# Patient Record
Sex: FEMALE | Race: WHITE | Employment: OTHER | URBAN - NONMETROPOLITAN AREA
[De-identification: names, ages, dates, MRNs, and addresses within clinical notes are randomized per-mention and may not be internally consistent; named-entity substitution may affect disease eponyms.]

---

## 2022-06-13 ENCOUNTER — HOSPITAL ENCOUNTER (OUTPATIENT)
Age: 82
Setting detail: OBSERVATION
Discharge: HOME | End: 2022-06-14
Attending: HOSPITALIST | Admitting: HOSPITALIST
Payer: MEDICARE

## 2022-06-13 PROBLEM — I48.91 ATRIAL FIBRILLATION (HCC): Status: ACTIVE | Noted: 2022-06-13

## 2022-06-13 PROBLEM — R07.9 CHEST PAIN: Status: ACTIVE | Noted: 2022-06-13

## 2022-06-13 LAB
ALBUMIN SERPL-MCNC: 4.1 G/DL (ref 3.5–5.2)
ALP BLD-CCNC: 72 U/L (ref 35–104)
ALT SERPL-CCNC: 16 U/L (ref 5–33)
ANION GAP SERPL CALCULATED.3IONS-SCNC: 11 MMOL/L (ref 7–19)
AST SERPL-CCNC: 24 U/L (ref 5–32)
BASOPHILS ABSOLUTE: 0 K/UL (ref 0–0.2)
BASOPHILS RELATIVE PERCENT: 0.5 % (ref 0–1)
BILIRUB SERPL-MCNC: 0.3 MG/DL (ref 0.2–1.2)
BUN BLDV-MCNC: 10 MG/DL (ref 8–23)
CALCIUM SERPL-MCNC: 9.2 MG/DL (ref 8.8–10.2)
CHLORIDE BLD-SCNC: 105 MMOL/L (ref 98–111)
CO2: 25 MMOL/L (ref 22–29)
CREAT SERPL-MCNC: 0.5 MG/DL (ref 0.5–0.9)
EOSINOPHILS ABSOLUTE: 0 K/UL (ref 0–0.6)
EOSINOPHILS RELATIVE PERCENT: 0.5 % (ref 0–5)
GFR AFRICAN AMERICAN: >59
GFR NON-AFRICAN AMERICAN: >60
GLUCOSE BLD-MCNC: 101 MG/DL (ref 74–109)
HBA1C MFR BLD: 5.4 % (ref 4–6)
HCT VFR BLD CALC: 42.6 % (ref 37–47)
HEMOGLOBIN: 13.4 G/DL (ref 12–16)
IMMATURE GRANULOCYTES #: 0 K/UL
INR BLD: 0.94 (ref 0.88–1.18)
LV EF: 55 %
LVEF MODALITY: NORMAL
LYMPHOCYTES ABSOLUTE: 1.2 K/UL (ref 1.1–4.5)
LYMPHOCYTES RELATIVE PERCENT: 20.5 % (ref 20–40)
MCH RBC QN AUTO: 30.6 PG (ref 27–31)
MCHC RBC AUTO-ENTMCNC: 31.5 G/DL (ref 33–37)
MCV RBC AUTO: 97.3 FL (ref 81–99)
MONOCYTES ABSOLUTE: 0.4 K/UL (ref 0–0.9)
MONOCYTES RELATIVE PERCENT: 7.7 % (ref 0–10)
NEUTROPHILS ABSOLUTE: 4 K/UL (ref 1.5–7.5)
NEUTROPHILS RELATIVE PERCENT: 70.4 % (ref 50–65)
PDW BLD-RTO: 13.9 % (ref 11.5–14.5)
PLATELET # BLD: 170 K/UL (ref 130–400)
PMV BLD AUTO: 9.7 FL (ref 9.4–12.3)
POTASSIUM SERPL-SCNC: 4.1 MMOL/L (ref 3.5–5)
PRO-BNP: 1304 PG/ML (ref 0–1800)
PROTHROMBIN TIME: 12.5 SEC (ref 12–14.6)
RBC # BLD: 4.38 M/UL (ref 4.2–5.4)
REASON FOR REJECTION: NORMAL
REJECTED TEST: NORMAL
SODIUM BLD-SCNC: 141 MMOL/L (ref 136–145)
T4 FREE: 1.11 NG/DL (ref 0.93–1.7)
TOTAL PROTEIN: 6.4 G/DL (ref 6.6–8.7)
TROPONIN: <0.01 NG/ML (ref 0–0.03)
TROPONIN: <0.01 NG/ML (ref 0–0.03)
TSH SERPL DL<=0.05 MIU/L-ACNC: 0.72 UIU/ML (ref 0.27–4.2)
WBC # BLD: 5.7 K/UL (ref 4.8–10.8)

## 2022-06-13 PROCEDURE — 80053 COMPREHEN METABOLIC PANEL: CPT

## 2022-06-13 PROCEDURE — 84443 ASSAY THYROID STIM HORMONE: CPT

## 2022-06-13 PROCEDURE — 85610 PROTHROMBIN TIME: CPT

## 2022-06-13 PROCEDURE — 85025 COMPLETE CBC W/AUTO DIFF WBC: CPT

## 2022-06-13 PROCEDURE — 2580000003 HC RX 258

## 2022-06-13 PROCEDURE — 6370000000 HC RX 637 (ALT 250 FOR IP)

## 2022-06-13 PROCEDURE — 93306 TTE W/DOPPLER COMPLETE: CPT

## 2022-06-13 PROCEDURE — 96372 THER/PROPH/DIAG INJ SC/IM: CPT

## 2022-06-13 PROCEDURE — 36415 COLL VENOUS BLD VENIPUNCTURE: CPT

## 2022-06-13 PROCEDURE — 6360000002 HC RX W HCPCS

## 2022-06-13 PROCEDURE — G0378 HOSPITAL OBSERVATION PER HR: HCPCS

## 2022-06-13 PROCEDURE — 99213 OFFICE O/P EST LOW 20 MIN: CPT | Performed by: INTERNAL MEDICINE

## 2022-06-13 PROCEDURE — 83880 ASSAY OF NATRIURETIC PEPTIDE: CPT

## 2022-06-13 PROCEDURE — 84439 ASSAY OF FREE THYROXINE: CPT

## 2022-06-13 PROCEDURE — 83036 HEMOGLOBIN GLYCOSYLATED A1C: CPT

## 2022-06-13 PROCEDURE — G0379 DIRECT REFER HOSPITAL OBSERV: HCPCS

## 2022-06-13 PROCEDURE — 84484 ASSAY OF TROPONIN QUANT: CPT

## 2022-06-13 RX ORDER — SODIUM CHLORIDE 9 MG/ML
INJECTION, SOLUTION INTRAVENOUS PRN
Status: DISCONTINUED | OUTPATIENT
Start: 2022-06-13 | End: 2022-06-14 | Stop reason: HOSPADM

## 2022-06-13 RX ORDER — SODIUM CHLORIDE 0.9 % (FLUSH) 0.9 %
5-40 SYRINGE (ML) INJECTION PRN
Status: DISCONTINUED | OUTPATIENT
Start: 2022-06-13 | End: 2022-06-14 | Stop reason: HOSPADM

## 2022-06-13 RX ORDER — ASPIRIN 81 MG/1
81 TABLET, CHEWABLE ORAL DAILY
Status: DISCONTINUED | OUTPATIENT
Start: 2022-06-13 | End: 2022-06-14 | Stop reason: HOSPADM

## 2022-06-13 RX ORDER — NITROGLYCERIN 0.4 MG/1
0.4 TABLET SUBLINGUAL EVERY 5 MIN PRN
Status: DISCONTINUED | OUTPATIENT
Start: 2022-06-13 | End: 2022-06-14 | Stop reason: HOSPADM

## 2022-06-13 RX ORDER — ONDANSETRON 2 MG/ML
4 INJECTION INTRAMUSCULAR; INTRAVENOUS EVERY 6 HOURS PRN
Status: DISCONTINUED | OUTPATIENT
Start: 2022-06-13 | End: 2022-06-14 | Stop reason: HOSPADM

## 2022-06-13 RX ORDER — ACETAMINOPHEN 325 MG/1
650 TABLET ORAL EVERY 6 HOURS PRN
Status: DISCONTINUED | OUTPATIENT
Start: 2022-06-13 | End: 2022-06-14 | Stop reason: HOSPADM

## 2022-06-13 RX ORDER — SODIUM CHLORIDE 0.9 % (FLUSH) 0.9 %
5-40 SYRINGE (ML) INJECTION EVERY 12 HOURS SCHEDULED
Status: DISCONTINUED | OUTPATIENT
Start: 2022-06-13 | End: 2022-06-14 | Stop reason: HOSPADM

## 2022-06-13 RX ORDER — ACETAMINOPHEN 650 MG/1
650 SUPPOSITORY RECTAL EVERY 6 HOURS PRN
Status: DISCONTINUED | OUTPATIENT
Start: 2022-06-13 | End: 2022-06-14 | Stop reason: HOSPADM

## 2022-06-13 RX ORDER — ENOXAPARIN SODIUM 100 MG/ML
1 INJECTION SUBCUTANEOUS 2 TIMES DAILY
Status: DISCONTINUED | OUTPATIENT
Start: 2022-06-13 | End: 2022-06-14

## 2022-06-13 RX ORDER — POLYETHYLENE GLYCOL 3350 17 G/17G
17 POWDER, FOR SOLUTION ORAL DAILY PRN
Status: DISCONTINUED | OUTPATIENT
Start: 2022-06-13 | End: 2022-06-14 | Stop reason: HOSPADM

## 2022-06-13 RX ORDER — ATORVASTATIN CALCIUM 10 MG/1
10 TABLET, FILM COATED ORAL NIGHTLY
Status: DISCONTINUED | OUTPATIENT
Start: 2022-06-13 | End: 2022-06-14 | Stop reason: HOSPADM

## 2022-06-13 RX ORDER — ONDANSETRON 4 MG/1
4 TABLET, ORALLY DISINTEGRATING ORAL EVERY 8 HOURS PRN
Status: DISCONTINUED | OUTPATIENT
Start: 2022-06-13 | End: 2022-06-14 | Stop reason: HOSPADM

## 2022-06-13 RX ADMIN — SODIUM CHLORIDE, PRESERVATIVE FREE 10 ML: 5 INJECTION INTRAVENOUS at 20:44

## 2022-06-13 RX ADMIN — ATORVASTATIN CALCIUM 10 MG: 10 TABLET, FILM COATED ORAL at 20:44

## 2022-06-13 RX ADMIN — ENOXAPARIN SODIUM 50 MG: 100 INJECTION SUBCUTANEOUS at 20:44

## 2022-06-13 RX ADMIN — ASPIRIN 81 MG 81 MG: 81 TABLET ORAL at 18:01

## 2022-06-13 ASSESSMENT — ENCOUNTER SYMPTOMS
NAUSEA: 1
VOMITING: 1

## 2022-06-13 NOTE — H&P
Quirino Damon - History & Physical      PCP: No primary care provider on file. Date of Admission: 6/13/2022    Date of Service: 6/13/2022    Chief Complaint:  Chest pain, palpitations    History Of Present Illness: The patient is a 80 y.o. female who presented to [facility] direct admit from [facility] ER with PMH HTN, anxiety complaining of chest pain. Patient is from [place] visiting with friends. Patient states that she woke up at 3 AM this morning having midsternal chest pressure with radiation to left arm. She states that she felt her heart racing and then became nauseated and vomited prior to arrival.  Patient states that her friend is a retired RN and instructed her to ER for further evaluation. States that she has had history of palpitations however has not been evaluated. Denies recent illness, fever, chills, abdominal pain, shortness of breath, and abdominal pain. Denies recent fall, syncope, hemoptysis or bilateral lower extremity swelling. Work-up at OSH ER , K3.8, creatinine 0.8 BUN 15, mag 1.9, troponin 0.024, WBC 7.6, COVID-negative, CXR suggestive COPD. Received 1L NS bolus, Diltiazem 20 mg IV, lorazepam 1 mg IV and Zofran 4 mg IV while in ER. On arrival to [facility] patient is pain-free, NSR 90, resting comfortably in bed, and in no acute distress. Patient is to be admitted to hospitalist service with consultation to cardiology due to chest pain and new onset atrial fibrillation. Past Medical History:    History reviewed. No pertinent past medical history. Past Surgical History:        Procedure Laterality Date    BREAST BIOPSY Bilateral        Home Medications:  Prior to Admission medications    Medication Sig Start Date End Date Taking? Authorizing Provider   tretinoin (RETIN-A) 0.01 % gel Apply  topically nightly. Apply topically nightly.     Historical Provider, MD       Allergies:    Gluten    Social History:    The patient currently lives home with spouse in [place]  Tobacco:   reports that she has never smoked. She has never used smokeless tobacco.  Alcohol:   reports current alcohol use. Illicit Drugs: denies    Family History:      Problem Relation Age of Onset    Ovarian Cancer Mother        Review of Systems:   Review of Systems   Constitutional: Negative. HENT: Negative. Cardiovascular: Positive for chest pain and palpitations. Gastrointestinal: Positive for nausea and vomiting. Genitourinary: Negative. Musculoskeletal: Negative. Skin: Negative. Neurological: Negative for dizziness, syncope, weakness and numbness. Psychiatric/Behavioral: The patient is nervous/anxious. 14 point review of systems is negative except as specifically addressed above. Physical Examination:  /71   Pulse 73   Temp 97.7 °F (36.5 °C) (Temporal)   Resp 18   Wt 120 lb 14.4 oz (54.8 kg)   SpO2 98%   BMI 20.75 kg/m²   Physical Exam  Constitutional:       General: She is not in acute distress. Appearance: Normal appearance. HENT:      Mouth/Throat:      Mouth: Mucous membranes are moist.      Pharynx: Oropharynx is clear. Eyes:      Extraocular Movements: Extraocular movements intact. Conjunctiva/sclera: Conjunctivae normal.      Pupils: Pupils are equal, round, and reactive to light. Cardiovascular:      Rate and Rhythm: Normal rate and regular rhythm. Pulses: Normal pulses. Heart sounds: Normal heart sounds. No murmur heard. Pulmonary:      Effort: Pulmonary effort is normal. No respiratory distress. Breath sounds: Normal breath sounds. Chest:      Chest wall: No tenderness. Abdominal:      General: Bowel sounds are normal. There is no distension. Palpations: Abdomen is soft. Tenderness: There is no abdominal tenderness. There is no guarding or rebound. Musculoskeletal:         General: No swelling. Normal range of motion. Cervical back: Normal range of motion and neck supple.  No rigidity or tenderness. Right lower leg: No edema. Left lower leg: No edema. Skin:     General: Skin is warm and dry. Capillary Refill: Capillary refill takes less than 2 seconds. Neurological:      General: No focal deficit present. Mental Status: She is alert and oriented to person, place, and time. Cranial Nerves: No cranial nerve deficit. Motor: No weakness. Psychiatric:         Mood and Affect: Mood normal.         Behavior: Behavior normal.          Diagnostic Data:  CBC:No results for input(s): WBC, HGB, HCT, PLT in the last 72 hours.   BMP:  Recent Labs     06/13/22  1510      K 4.1      CO2 25   BUN 10   CREATININE 0.5   CALCIUM 9.2     Recent Labs     06/13/22  1510   AST 24   ALT 16   BILITOT 0.3   ALKPHOS 72     Coag Panel:   Recent Labs     06/13/22  1510   INR 0.94   PROTIME 12.5     Cardiac Enzymes:   Recent Labs     06/13/22  1510   TROPONINI <0.01       Assessment/Plan:    Chest pain  - Cardiology consultation and recommendations appreciated  - Aspirin and Lipitor  - SL Nitro PRN  - ECHO   - Trend troponin  - FLP in a.m./ HgbA1c  - Monitor on telemetry    Atrial fibrillation   -Currently, NSR 78 bpm    -TSH  - Serial and PRN EKGs  - Monitor on telemetry      DVT prophylactic: Lovenox     Signed:  AILYN Hayes - CNP, 6/13/2022 2:58 PM

## 2022-06-13 NOTE — CONSULTS
[facility]) Cardiology   Consult Note       Requesting MD:  Alessandro Zamorano MD   Admit Status:         Patient:    Sandy Barnett  [id number]  1940         Chief Complaint: Chest pain and palpitation  HPI: Patient is a 80 y.o. female went to bed after get-together party and woke up at 3 AM with substernal chest tightness. This was not associated with shortness of breath or diaphoresis. She had similar symptoms 10 years ago and had a negative cardiac work-up at that time. After 3 hours she was seen in the emergency room, at that time, she was nauseated and vomited prior to arrival to the emergency room. The pain then subsided. EKG was normal with no ischemia. There were no documented strip of atrial fibrillation in the chart. She is known to have hypertension and hyperlipidemia. She is a non-smoker and drinks occasionally. There has been no history of myocardial infarct or CVA. In a good day, patient can do anything she wants to do with no difficulty.     Review of Systems:  HENNT: normal  PULMONARY: normal   CVS:see history of present illness  ABD: denies any abdominal pain  PERIPHERL: normal  MSK: no swelling of the lower extremities  CNS: Denies any dizziness, syncopal episode or history of stroke  Renal: Denies any history of dysuria or frequency    Cardiac Specific Data:  Specialty Problems        Cardiology Problems    Atrial fibrillation (HCC)        * (Principal) Chest pain              Past Medical History:  Past Medical History:   Diagnosis Date    Atrial fibrillation ([facility] [address] 6/13/2022        Past Surgical History:  Past Surgical History:   Procedure Laterality Date    BREAST BIOPSY Bilateral        Past Family History:  Family History   Problem Relation Age of Onset    Ovarian Cancer Mother        Past Social History:  Social History     Socioeconomic History    Marital status:      Spouse name: Not on file    Number of children: Not on file    Years of education: Not on file   [employer] [facility] [employer] education level: Not on file   Occupational History    Not on file   Tobacco Use    Smoking status: Never Smoker    Smokeless tobacco: Never Used   Substance and Sexual Activity    Alcohol use: Yes     Comment: occasional    Drug use: No    Sexual activity: Never   Other Topics Concern    Not on file   Social History Narrative    Not on file     Social Determinants of Health     Financial Resource Strain:     Difficulty of Paying Living Expenses: Not on file   Food Insecurity:     Worried About Running Out of Food in the Last Year: Not on file    Bettye of Food in the Last Year: Not on file   Transportation Needs:     Lack of Transportation (Medical): Not on file    Lack of Transportation (Non-Medical): Not on file   Physical Activity:     Days of Exercise per Week: Not on file    Minutes of Exercise per Session: Not on file   Stress:     Feeling of Stress : Not on file   Social Connections:     Frequency of Communication with Friends and Family: Not on file    Frequency of Social Gatherings with Friends and Family: Not on file    Attends [employer] Services: Not on file    Active Member of [address] [place] [address] or Organizations: Not on file    Attends Club or Organization Meetings: Not on file    Marital Status: Not on file   Intimate Partner Violence:     Fear of Current or Ex-Partner: Not on file    Emotionally Abused: Not on file    Physically Abused: Not on file    Sexually Abused: Not on file   Housing Stability:     Unable to Pay for Housing in the Last Year: Not on file    Number of [place] in the Last Year: Not on file    Unstable Housing in the Last Year: Not on file       Allergies: Allergies   Allergen Reactions    Gluten        Prior to Admission medications    Medication Sig Start Date End Date Taking? Authorizing Provider   tretinoin (RETIN-A) 0.01 % gel Apply  topically nightly. Apply topically nightly.     Historical Provider, MD        Current Facility-Administered Medications Medication Dose Route Frequency Provider Last Rate Last Admin    sodium chloride flush 0.9 % injection 5-40 mL  5-40 mL IntraVENous 2 times per day Sarwat Beavers, APRN - CNP        sodium chloride flush 0.9 % injection 5-40 mL  5-40 mL IntraVENous PRN Sarwat Orellanas, APRN - CNP        0.9 % sodium chloride infusion   IntraVENous PRN Sarwat Orellanas, APRN - CNP        ondansetron (ZOFRAN-ODT) disintegrating tablet 4 mg  4 mg Oral Q8H PRN Sarwat Knows, APRN - CNP        Or    ondansetron [facility]) injection 4 mg  4 mg IntraVENous Q6H PRN Fourmile Knows, APRN - CNP        polyethylene glycol (GLYCOLAX) packet 17 g  17 g Oral Daily PRN Sarawt Orellanas, APRN - CNP        acetaminophen (TYLENOL) tablet 650 mg  650 mg Oral Q6H PRN Sarwat Knows, APRN - CNP        Or    acetaminophen (TYLENOL) suppository 650 mg  650 mg Rectal Q6H PRN Sarwat Orellanas, APRN - CNP        perflutren lipid microspheres (DEFINITY) injection 1.65 mg  1.5 mL IntraVENous ONCE PRN Sarwat Knows, APRN - CNP        enoxaparin (LOVENOX) injection 50 mg  1 mg/kg SubCUTAneous BID Sarwat Beavers, APRN - CNP        aspirin chewable tablet 81 mg  81 mg Oral Daily aSrwat Beavers, APRN - CNP        atorvastatin (LIPITOR) tablet 10 mg  10 mg Oral Nightly Sarwat Beavers, APRN - CNP        nitroGLYCERIN (NITROSTAT) SL tablet 0.4 mg  0.4 mg SubLINGual Q5 Min PRN Sarwat Beavers, APRN - CNP            Current Infused Meds:   sodium chloride         Physical Exam:  Vitals:    06/13/22 1747   BP: (!) 143/71   Pulse: 76   Resp: 18   Temp: 97.7 °F (36.5 °C)   SpO2: 98%     No intake or output data in the 24 hours ending 06/13/22 1800  Estimated body mass index is 20.75 kg/m² as calculated from the following:    Height as of 10/9/14: 5' 4\" (1.626 m). Weight as of this encounter: 120 lb 14.4 oz (54.8 kg).     PHYSICAL EXAM:  HENNT: normal  PULMONARY: normal to inspection, palpation, percussion and ascultation  CVS:Normal neck vein, S1 and S2 normal, no murmur  ABD: liver and spleen not palpable, nontender, bowel sound normal  PERIPHERL: all pulses are normal with no bruit  MSK: no swelling of the lower extremities. Some tenderness on palpation of the left-sided chest wall but the pain was not not the same as she experienced this morning  CNS: Normal CN 2,3,4,6,5,7,9,10,11,and 12. Oriented to time, place and person    Labs:  Recent Labs     06/13/22  1610   WBC 5.7   HGB 13.4          Recent Labs     06/13/22  1510      K 4.1      CO2 25   BUN 10   CREATININE 0.5   LABGLOM >60   CALCIUM 9.2       CK, CKMB, Troponin:   Recent Labs     06/13/22  1510   TROPONINI <0.01       Last 3 BNP:  Recent Labs     06/13/22  1510   PROBNP 1,304             No results found. Assessment and Plan:  1. Chest tightness for 3 hours, etiology unclear, normal EKG and enzymes  2. Hypertension and hyperlipidemia    Plan: We will proceed with nuclear stress test tomorrow. There is no documented strip for atrial fibrillation in the chart. We have not seen any such rhythm. .    I have spent 60 minutes reviewing the chart, taking history, examining the patient, discussion with the patient and the family concerning the finding, diagnoses and treatment plan. This dictation was performed using [address] [place]. Mistake and misspelling may have been created without  realizing them. Please notify me for clarification.   Thank you    Naomy Beltran MD   6/13/2022, 6:00 PM

## 2022-06-14 ENCOUNTER — APPOINTMENT (OUTPATIENT)
Dept: NUCLEAR MEDICINE | Age: 82
End: 2022-06-14
Attending: HOSPITALIST
Payer: MEDICARE

## 2022-06-14 VITALS
HEART RATE: 84 BPM | HEIGHT: 64 IN | BODY MASS INDEX: 18.64 KG/M2 | TEMPERATURE: 98.1 F | RESPIRATION RATE: 20 BRPM | OXYGEN SATURATION: 98 % | SYSTOLIC BLOOD PRESSURE: 140 MMHG | DIASTOLIC BLOOD PRESSURE: 73 MMHG | WEIGHT: 109.2 LBS

## 2022-06-14 LAB
CHOLESTEROL, TOTAL: 189 MG/DL (ref 160–199)
EKG P AXIS: 76 DEGREES
EKG P-R INTERVAL: 152 MS
EKG Q-T INTERVAL: 390 MS
EKG QRS DURATION: 88 MS
EKG QTC CALCULATION (BAZETT): 416 MS
EKG T AXIS: 56 DEGREES
HDLC SERPL-MCNC: 105 MG/DL (ref 65–121)
LDL CHOLESTEROL CALCULATED: 75 MG/DL
LV EF: 83 %
LVEF MODALITY: NORMAL
TRIGL SERPL-MCNC: 43 MG/DL (ref 0–149)

## 2022-06-14 PROCEDURE — 93018 CV STRESS TEST I&R ONLY: CPT | Performed by: INTERNAL MEDICINE

## 2022-06-14 PROCEDURE — G0378 HOSPITAL OBSERVATION PER HR: HCPCS

## 2022-06-14 PROCEDURE — 6360000002 HC RX W HCPCS

## 2022-06-14 PROCEDURE — 78452 HT MUSCLE IMAGE SPECT MULT: CPT | Performed by: INTERNAL MEDICINE

## 2022-06-14 PROCEDURE — 96372 THER/PROPH/DIAG INJ SC/IM: CPT

## 2022-06-14 PROCEDURE — 3430000000 HC RX DIAGNOSTIC RADIOPHARMACEUTICAL: Performed by: NURSE PRACTITIONER

## 2022-06-14 PROCEDURE — 93016 CV STRESS TEST SUPVJ ONLY: CPT | Performed by: INTERNAL MEDICINE

## 2022-06-14 PROCEDURE — 93017 CV STRESS TEST TRACING ONLY: CPT

## 2022-06-14 PROCEDURE — 99214 OFFICE O/P EST MOD 30 MIN: CPT | Performed by: INTERNAL MEDICINE

## 2022-06-14 PROCEDURE — 36415 COLL VENOUS BLD VENIPUNCTURE: CPT

## 2022-06-14 PROCEDURE — 6370000000 HC RX 637 (ALT 250 FOR IP)

## 2022-06-14 PROCEDURE — 93010 ELECTROCARDIOGRAM REPORT: CPT | Performed by: INTERNAL MEDICINE

## 2022-06-14 PROCEDURE — 80061 LIPID PANEL: CPT

## 2022-06-14 PROCEDURE — 93005 ELECTROCARDIOGRAM TRACING: CPT

## 2022-06-14 PROCEDURE — A9502 TC99M TETROFOSMIN: HCPCS | Performed by: NURSE PRACTITIONER

## 2022-06-14 PROCEDURE — 2580000003 HC RX 258

## 2022-06-14 RX ORDER — ENOXAPARIN SODIUM 100 MG/ML
1 INJECTION SUBCUTANEOUS 2 TIMES DAILY
Status: DISCONTINUED | OUTPATIENT
Start: 2022-06-14 | End: 2022-06-14 | Stop reason: HOSPADM

## 2022-06-14 RX ADMIN — ENOXAPARIN SODIUM 50 MG: 100 INJECTION SUBCUTANEOUS at 09:14

## 2022-06-14 RX ADMIN — ASPIRIN 81 MG 81 MG: 81 TABLET ORAL at 09:14

## 2022-06-14 RX ADMIN — SODIUM CHLORIDE, PRESERVATIVE FREE 10 ML: 5 INJECTION INTRAVENOUS at 09:18

## 2022-06-14 RX ADMIN — TETROFOSMIN 24 MILLICURIE: 1.38 INJECTION, POWDER, LYOPHILIZED, FOR SOLUTION INTRAVENOUS at 09:08

## 2022-06-14 RX ADMIN — TETROFOSMIN 8 MILLICURIE: 1.38 INJECTION, POWDER, LYOPHILIZED, FOR SOLUTION INTRAVENOUS at 09:09

## 2022-06-14 NOTE — DISCHARGE INSTR - DIET

## 2022-06-14 NOTE — DISCHARGE SUMMARY
Liliana Rolon  :  1940  MRN:  [id number]    Admit date:  2022  Discharge date:  2022    Discharging Physician:  Dr. Joey Nelson Directive: Full Code    Consults: IP CONSULT TO CARDIOLOGY     Primary Care Physician:  No primary care provider on file. Discharge Diagnoses:  Principal Problem:    Chest pain  Active Problems:    Atrial fibrillation (HCC)  Resolved Problems:    * No resolved hospital problems. *      Portions of this note have been copied forward, however, changed to reflect the most current clinical status of this patient. Hospital Course: The patient is a 82-year-old female with past medical history of hypertension and anxiety who presented to [facility] as a direct admit from outside [facility] ED. Patient reported midsternal chest pain that radiated to the left arm with palpitations. Patient did report nausea and vomiting associated with the event. Work-up at outside facility was fairly unremarkable however patient was transferred to this facility for cardiac work-up. Cardiology was consulted and troponins were trended. Troponins remain negative. Echo fairly unremarkable with normal systolic function and possible mild diastolic dysfunction. Stress test completed with no signs of ischemia or infarct. No significant hypertension noted during admission. Patient reported being under a significant mount of stress at home and having high anxiety, patient reports discussing this with her PCP in the past but not wanting to take medications. Encourage patient to rediscuss anxiety with her PCP. Patient is now medically stable for discharge home.     Significant Diagnostic Studies:   Echo Complete    Result Date: 2022  Transthoracic Echocardiography Report (TTE)  Demographics   Patient Name   Porter Neal  Date of Study            2022   MRN            [id number]          Gender                   Female   Date of Birth  1940      Room Number              ZJQ-8705   Age 81 year(s)   Height:        64 inches       Referring Physician      Compa Reed   Weight:        120 pounds      Sonographer              Lucia Hill   BSA:           1.57 m^2        Interpreting Physician   Hayley Borden MD   BMI:           20.6 kg/m^2  Procedure Type of Study   TTE procedure:ECHO NO CONTRAST WITH DOP/COLR. Study Location: Echo Lab Technical Quality: Adequate visualization Patient Status: Inpatient HR: 76 bpm BP: 118/71 mmHg Indications:Chest pain. Conclusions   Summary  Left ventricle had normal chamber size and thickness  Grade 2 diastolic dysfunction  Preserved systolic wall motion with ejection fraction of 55%  No regional wall motion abnormality  No significant valvular lesion seen   Signature   ----------------------------------------------------------------  Electronically signed by Hayley Borden MD(Interpreting physician)  on 06/13/2022 06:28 PM  ----------------------------------------------------------------   Findings   Mitral Valve  No mitral regurgitation   Aortic Valve  Aortic sclerosis   Tricuspid Valve  Trivial tricuspid insufficiency, right ventricular systolic pressure  cannot be estimated   Pulmonic Valve  The pulmonic valve was not well visualized . Left Atrium  Normal size left atrium. Left Ventricle  See conclusion   Right Atrium  Normal right atrial dimension with no evidence of thrombus or mass noted. Right Ventricle  Normal right ventricular size with preserved RV function. Miscellaneous  Normal IVC  2D Measurements and Calculations(cm)   LVIDd: 3.62 cm                      LVIDs: 2.6 cm  IVSd: 0.78 cm  LVPWd: 0.82 cm                      AO Root Dimension: 2.1 cm  Rt. Vent.  Dimension: 2.28 cm        LA Dimension: 2.7 cm  % Ejection Fraction: 55 %           LA Area: 14.5 cm^2  LA Volume: 32 ml                    LV Systolic dimension: 7.0FI  LA Volume Index: 20 ml/m^2          LV PW diastolic: 9.01OW  LV dimension: 3.62 cm               LVOT diameter: 1.9 cm RA Systolic pressure: 3mmHg  LVEDV: 57.7 ml                      RV Diastolic dimension: 6.57KB  LVESV:22 ml                         LVEDVI: 37 ml/m^2  Cardic Output (CO): 4.7l/min        LVESVI: 14 ml/m^2  Ascending Aorta: 2.5 cm  Doppler Measurements and Calculations   AV Peak Velocity:155 cm/s              MV Peak E-Wave: 82.7 cm/s  AV Mean Velocity:103 cm/s              MV Peak A-Wave: 50.1 cm/s  AV Peak Gradient: 9.61 mmHg            MV E/A Ratio: 1.65 %  AV Mean Gradient: 5 mmHg               MV Mean velocity: 67.2cm/s  AV Area (Continuity):1.96 cm^2         MV Peak Gradient: 2.74 mmHg  AV VTI:31.5 cm/s                       MV Mean gradient: 2 mmHg  TR Velocity:208 cm/s                   MV P1/2t: 56 msec  TR Gradient:17.31 mmHg                 MVA by PHT3.93 cm^2  Estimated RAP:3 mmHg  RVSP:20 mmHg   MV E' septal velocity: 7.07cm/s  MV E' lateral velocity:4.79 cm/s        Pertinent Labs:   CBC:   Recent Labs     06/13/22  1610   WBC 5.7   HGB 13.4        BMP:    Recent Labs     06/13/22  1510      K 4.1      CO2 25   BUN 10   CREATININE 0.5   GLUCOSE 101     INR:   Recent Labs     06/13/22  1510   INR 0.94       Physical Exam:  Vital Signs: BP (!) 140/73   Pulse 84   Temp 98.1 °F (36.7 °C) (Temporal)   Resp 20   Ht 5' 4\" (1.626 m)   Wt 109 lb 3.2 oz (49.5 kg)   SpO2 98%   BMI 18.74 kg/m²   Physical Exam    Discharge Medications:         Medication List      CONTINUE taking these medications    Retin-A 0.01 % gel  Generic drug: tretinoin            Discharge Instructions: Follow up with PCP in 5-7 days. Take medications as directed. Resume activity as tolerated. Diet: ADULT DIET; Regular     Disposition: Patient is Stableand will be discharged to Home.     Time spent on discharge 31 minutes spent in assessing patient, reviewing medications, discussion with nursing, confirming safe discharge plan and preparation of discharge summary.     Signed:  AILYN Copeland - CNP, 6/14/2022 1:16 PM

## 2022-06-14 NOTE — PROGRESS NOTES
Comprehensive Nutrition Assessment    Type and Reason for Visit:  Initial,Positive Nutrition Screen    Nutrition Recommendations/Plan:   1. Follow for diet advancement, tolerance, po intake     Malnutrition Assessment:  Malnutrition Status: At risk for malnutrition (Comment) (06/14/22 5569)    Context:  Acute Illness     Findings of the 6 clinical characteristics of malnutrition:  Energy Intake:  Unable to assess  Weight Loss:  Unable to assess     Body Fat Loss: Moderate body fat loss Orbital   Muscle Mass Loss:  Mild muscle mass loss Hand (interosseous),Thigh (quadraceps)  Fluid Accumulation:  Unable to assess Extremities   Strength:  Not Performed    Nutrition Assessment:    +NS for Gluten food allergy. At present time pt is NPO. Nutrition Related Findings:    thin appearing. For stress test today Wound Type: None       Current Nutrition Intake & Therapies:    Average Meal Intake: NPO  Average Supplements Intake: NPO  Diet NPO    Anthropometric Measures:  Height: 5' 4\" (162.6 cm)  Ideal Body Weight (IBW): 120 lbs (55 kg)    Admission Body Weight: 109 lb 3.2 oz (49.5 kg)  Current Body Weight: 109 lb 3.2 oz (49.5 kg), 91 % IBW. Weight Source: Standing Scale  Current BMI (kg/m2): 18.7  BMI Categories: Underweight (BMI less than 22) age over 72    Estimated Daily Nutrient Needs:  Energy Requirements Based On: Kcal/kg  Weight Used for Energy Requirements: Current  Energy (kcal/day): 0581-0984 kcals (30-35 kcals. kg)  Weight Used for Protein Requirements: Current  Protein (g/day): 74g  Method Used for Fluid Requirements: 1 ml/kcal  Fluid (ml/day): 1117-6759 ml    Nutrition Diagnosis:   · Inadequate oral intake related to acute injury/trauma as evidenced by NPO or clear liquid status due to medical condition,moderate loss of subcutaneous fat      Nutrition Interventions:   Food and/or Nutrient Delivery: Continue NPO  Nutrition Education/Counseling: No recommendation at this time  Coordination of Nutrition

## 2022-06-14 NOTE — PROGRESS NOTES
Cardiology Progress Note   Randolph Adkins MD      Patient:    Keren Figueroa  [id number]  1940    Patient Active Problem List    Diagnosis Date Noted    Chest pain 06/13/2022     Priority: Medium    Atrial fibrillation ([address] 06/13/2022     Priority: Medium       Admit Date:  6/13/2022    Admission Problem List: Present on Admission:   Chest pain   Atrial fibrillation [facility])       Cardiac Specific Data:  Specialty Problems        Cardiology Problems    Atrial fibrillation (HCC)        * (Principal) Chest pain              Subjective:  Patient denies any chest pain. Nuclear stress test was completely within normal limit.   There has been no evidence of atrial fibrillation    Objective:   BP (!) 140/73   Pulse 84   Temp 98.1 °F (36.7 °C) (Temporal)   Resp 20   Ht 5' 4\" (1.626 m)   Wt 109 lb 3.2 oz (49.5 kg)   SpO2 98%   BMI 18.74 kg/m²       Intake/Output Summary (Last 24 hours) at 6/14/2022 1310  Last data filed at 6/14/2022 9199  Gross per 24 hour   Intake 600 ml   Output 850 ml   Net -250 ml       Current Facility-Administered Medications   Medication Dose Route Frequency Provider Last Rate Last Admin    enoxaparin (LOVENOX) injection 50 mg  1 mg/kg SubCUTAneous BID Niki Brochure, APRN - CNP   50 mg at 06/14/22 0914    sodium chloride flush 0.9 % injection 5-40 mL  5-40 mL IntraVENous 2 times per day Niki Brochure, APRN - CNP   10 mL at 06/14/22 0918    sodium chloride flush 0.9 % injection 5-40 mL  5-40 mL IntraVENous PRN Niki Brochure, APRN - CNP        0.9 % sodium chloride infusion   IntraVENous PRN Niki Brochure, APRN - CNP        ondansetron (ZOFRAN-ODT) disintegrating tablet 4 mg  4 mg Oral Q8H PRN Niki Brochure, APRN - CNP        Or    [facility] injection 4 mg  4 mg IntraVENous Q6H PRN Niki Brochure, APRN - CNP        polyethylene glycol (GLYCOLAX) packet 17 g  17 g Oral Daily PRN Niki Brochure, APRN - CNP        acetaminophen (TYLENOL) tablet 650 mg  650 mg Oral Q6H PRN Loura Millet, APRN - CNP        Or    acetaminophen (TYLENOL) suppository 650 mg  650 mg Rectal Q6H PRN Loura Millet, APRN - CNP        perflutren lipid microspheres (DEFINITY) injection 1.65 mg  1.5 mL IntraVENous ONCE PRN Loura Millet, APRN - CNP        aspirin chewable tablet 81 mg  81 mg Oral Daily Loura Millet, APRN - CNP   81 mg at 06/14/22 0914    atorvastatin (LIPITOR) tablet 10 mg  10 mg Oral Nightly Loura Millet, APRN - CNP   10 mg at 06/13/22 2044    nitroGLYCERIN (NITROSTAT) SL tablet 0.4 mg  0.4 mg SubLINGual Q5 Min PRN Loura Millet, APRN - CNP             enoxaparin  1 mg/kg SubCUTAneous BID    sodium chloride flush  5-40 mL IntraVENous 2 times per day    aspirin  81 mg Oral Daily    atorvastatin  10 mg Oral Nightly         Physical Exam:  General: NAD, alert  HEENT: normal  CHEST: clear to ascultation  CVS: S1 and S2 normal, no murmur, no JVD  ABD; nontender, bowel sounds normal, liver and spleen not palpable  MSK: normal  CNS: oriented X3, normal affect, normal CN  PVD:  all peripheral pulses normal, no swelling of the ankles      RECENT LABS:    Lab Data:  CBC:   Recent Labs     06/13/22  1610   WBC 5.7   HGB 13.4   HCT 42.6   MCV 97.3        BMP:   Recent Labs     06/13/22  1510      K 4.1      CO2 25   BUN 10   CREATININE 0.5     LIVER PROFILE:   Recent Labs     06/13/22  1510   AST 24   ALT 16   BILITOT 0.3   ALKPHOS 72     PT/INR:   Recent Labs     06/13/22  1510   PROTIME 12.5   INR 0.94     APTT: No results for input(s): APTT in the last 72 hours. CK, CKMB, Troponin:   Recent Labs     06/13/22  1510 06/13/22  2235   TROPONINI <0.01 <0.01       Last 3 BNP:  Recent Labs     06/13/22  1510   PROBNP 1,304       IMAGING:  No results found.        Assessment and Plan:    Chest pain for 3 hours, nonischemic in origin, normal nuclear stress test and echocardiogram    Plan, from cardiac standpoint patient can be discharged    I have spent 30 minutes reviewing the chart, taking history, examining the patient, discussion with the patient and the family concerning the finding, diagnoses and treatment plan. This dictation was performed using [employer] [place]. Mistake and misspelling may have been created without  realizing them. Please notify me for clarification.   Thank you    Lora Allen MD  6/14/2022 1:10 PM

## 2022-07-15 ENCOUNTER — TELEPHONE (OUTPATIENT)
Dept: CARDIOLOGY CLINIC | Age: 82
End: 2022-07-15